# Patient Record
Sex: FEMALE | Race: ASIAN | Employment: FULL TIME | URBAN - METROPOLITAN AREA
[De-identification: names, ages, dates, MRNs, and addresses within clinical notes are randomized per-mention and may not be internally consistent; named-entity substitution may affect disease eponyms.]

---

## 2017-02-09 ENCOUNTER — PRE VISIT (OUTPATIENT)
Dept: MATERNAL FETAL MEDICINE | Facility: CLINIC | Age: 37
End: 2017-02-09

## 2017-02-16 ENCOUNTER — HOSPITAL ENCOUNTER (OUTPATIENT)
Dept: ULTRASOUND IMAGING | Facility: CLINIC | Age: 37
Discharge: HOME | End: 2017-02-16
Attending: OBSTETRICS & GYNECOLOGY | Admitting: OBSTETRICS & GYNECOLOGY
Payer: COMMERCIAL

## 2017-02-16 ENCOUNTER — OFFICE VISIT (OUTPATIENT)
Dept: MATERNAL FETAL MEDICINE | Facility: CLINIC | Age: 37
End: 2017-02-16
Attending: OBSTETRICS & GYNECOLOGY
Payer: COMMERCIAL

## 2017-02-16 DIAGNOSIS — O09.522 ELDERLY MULTIGRAVIDA WITH ANTEPARTUM CONDITION OR COMPLICATION, SECOND TRIMESTER: Primary | ICD-10-CM

## 2017-02-16 DIAGNOSIS — O09.522 AMA (ADVANCED MATERNAL AGE) MULTIGRAVIDA 35+, SECOND TRIMESTER: Primary | ICD-10-CM

## 2017-02-16 DIAGNOSIS — O26.90 PREGNANCY RELATED CONDITION, UNSPECIFIED TRIMESTER: ICD-10-CM

## 2017-02-16 PROCEDURE — 76811 OB US DETAILED SNGL FETUS: CPT

## 2017-02-16 PROCEDURE — 96040 ZZH GENETIC COUNSELING, EACH 30 MINUTES: CPT | Mod: ZF | Performed by: GENETIC COUNSELOR, MS

## 2017-02-16 NOTE — PROGRESS NOTES
"Please see \"Imaging\" tab under \"Chart Review\" for details of today's US.    Pati Short, DO    "

## 2017-02-16 NOTE — MR AVS SNAPSHOT
After Visit Summary   2/16/2017    Yoko Rae    MRN: [id number]           Patient Information     Date Of Birth          1980        Visit Information        Provider Department      2/16/2017 8:45 AM Maris Moore [facility] Maternal Fetal Medicine [facility]        Today's Diagnoses     Elderly multigravida with antepartum condition or complication, second trimester    -  1    Pregnancy related condition, unspecified trimester           Follow-ups after your visit        Who to contact     If you have questions or need follow up information about today's clinic visit or your schedule please contact [facility] MATERNAL FETAL MEDICINE [facility] directly at [phone].  Normal or non-critical lab and imaging results will be communicated to you by Efficient Cloudhart, letter or phone within 4 business days after the clinic has received the results. If you do not hear from us within 7 days, please contact the clinic through Efficient Cloudhart or phone. If you have a critical or abnormal lab result, we will notify you by phone as soon as possible.  Submit refill requests through netZentry or call your pharmacy and they will forward the refill request to us. Please allow 3 business days for your refill to be completed.          Additional Information About Your Visit        MyChart Information     netZentry gives you secure access to your electronic health record. If you see a primary care provider, you can also send messages to your care team and make appointments. If you have questions, please call your primary care clinic.  If you do not have a primary care provider, please call [phone] and they will assist you.        Care EveryWhere ID     This is your Care EveryWhere ID. This could be used by other organizations to access your [place] medical records  [id number]        Your Vitals Were     Last Period                   12/08/2016            Blood Pressure from Last 3 Encounters:   10/28/14 129/89    11/22/13 110/62   11/06/13 124/80    Weight from Last 3 Encounters:   10/28/14 74.7 kg (164 lb 9.6 oz)   11/22/13 75.3 kg (166 lb)   11/04/13 81.6 kg (180 lb)              We Performed the Following     MFM Genetic Counseling        Primary Care Provider Office Phone # Fax #    Sunny Hunt [phone]       [facility] OBGYN CONSULTS [address] [facility] [place] [address]  [facility] [address]        Thank you!     Thank you for choosing [facility] - [facility]  for your care. Our goal is always to provide you with excellent care. Hearing back from our patients is one way we can continue to improve our services. Please take a few minutes to complete the written survey that you may receive in the mail after your visit with us. Thank you!             Your Updated Medication List - Protect others around you: Learn how to safely use, store and throw away your medicines at www.disposemymeds.org.          This list is accurate as of: 2/16/17  4:59 PM.  Always use your most recent med list.                   Brand Name Dispense Instructions for use    naproxen 500 MG tablet    NAPROSYN    30 tablet    Take 1 tablet (500 mg) by mouth 2 times daily as needed for moderate pain

## 2017-02-16 NOTE — MR AVS SNAPSHOT
After Visit Summary   2/16/2017    Yoko Rae    MRN: [id number]           Patient Information     Date Of Birth          1980        Visit Information        Provider Department      2/16/2017 10:00 AM Pati Short,  [facility] Maternal Fetal [facility]        Today's Diagnoses     AMA (advanced maternal age) multigravida 37+, second trimester    -  1       Follow-ups after your visit        Who to contact     If you have questions or need follow up information about today's clinic visit or your schedule please contact [facility] MATERNAL FETAL MEDICINE [facility] directly at [phone].  Normal or non-critical lab and imaging results will be communicated to you by Dexcomhart, letter or phone within 4 business days after the clinic has received the results. If you do not hear from us within 7 days, please contact the clinic through BiTMI[employer] Inct or phone. If you have a critical or abnormal lab result, we will notify you by phone as soon as possible.  Submit refill requests through Elastica or call your pharmacy and they will forward the refill request to us. Please allow 3 business days for your refill to be completed.          Additional Information About Your Visit        MyChart Information     Elastica gives you secure access to your electronic health record. If you see a primary care provider, you can also send messages to your care team and make appointments. If you have questions, please call your primary care clinic.  If you do not have a primary care provider, please call [phone] and they will assist you.        Care EveryWhere ID     This is your Care EveryWhere ID. This could be used by other organizations to access your [place] medical records  [id number]        Your Vitals Were     Last Period                   12/08/2016            Blood Pressure from Last 3 Encounters:   10/28/14 129/89   11/22/13 110/62   11/06/13 124/80    Weight from Last 3 Encounters:    10/28/14 74.7 kg (164 lb 9.6 oz)   11/22/13 75.3 kg (166 lb)   11/04/13 81.6 kg (180 lb)              Today, you had the following     No orders found for display       Primary Care Provider Office Phone # Fax #    Sunny Hunt [phone]       [facility] OBGYN CONSULTS [address] [place] [address]  [facility] [address]        Thank you!     Thank you for choosing [facility] - [facility]  for your care. Our goal is always to provide you with excellent care. Hearing back from our patients is one way we can continue to improve our services. Please take a few minutes to complete the written survey that you may receive in the mail after your visit with us. Thank you!             Your Updated Medication List - Protect others around you: Learn how to safely use, store and throw away your medicines at www.disposemymeds.org.          This list is accurate as of: 2/16/17 10:32 AM.  Always use your most recent med list.                   Brand Name Dispense Instructions for use    naproxen 500 MG tablet    NAPROSYN    30 tablet    Take 1 tablet (500 mg) by mouth 2 times daily as needed for moderate pain

## 2017-02-16 NOTE — PROGRESS NOTES
[facility] Fetal [facility]  Genetic Counseling Consult    Patient:  Yoko Rae YOB: 1980   Date of Service: 2017 Referring Provider:  Dr. Leticia Thomas     Yoko Rae was seen at the [facility] for genetic counseling consultation as part of her appointment for comprehensive ultrasound.  The indication for genetic counseling was advanced maternal age.  She was accompanied to clinic by her .       Summary/Plan:     1.   At age 37 at delivery, the risk for a woman to have a baby at birth with a chromosome problem is about 0.5%. Yoko had a normal NIPT analysis earlier in pregnancy through her local prenatal care provider's office. This normal result would be expected to significantly reduce the risk of a chromosome problem in this pregnancy.    2.  Prior to today's ultrasound, I met with Yoko to review pregnancy and family history.  We also reviewed her previous screening/testing results and talked about what additional information might be provided by today's ultrasound.  See details below.    3.  After our conversation, Yoko had a comprehensive (level II) ultrasound today.  Please see the ultrasound report for further details.    4.  Yoko was not interested in invasive prenatal testing for fetal chromosome abnormalities at this time.    Pregnancy History:   /Parity:   (1 )   Age at Delivery: 37 year old  JOSEE: 2017, by Ultrasound    Gestational Age: 19w0d    Yoko reports that she takes prenatal vitamins.  She also reports occasionally taking a vitamin C supplement and acetaminophen.      Yoko reports that she is not sure about her exact LMP date, and so the JOSEE for this pregnancy was established using her 9 week ultrasound.      Yoko reports that she had gestational diabetes during her last pregnancy. She also reports delivering that baby at 36 weeks 6 days after a spontaneous onset of  "labor.       Family History:     A four-generation pedigree was obtained and will be scanned in under the  Media  tab in EPIC. The following significant findings were reported by Yoko and her :      Family history of birth defect: Yoko reports that she has a paternal first cousin that  of complications of a birth defect involving that baby's airway; she could not remember the name of this defect.  She reports that she is not aware of any other family history of other babies in the family with a similar issue in the many other relatives on that side of the family, nor was she aware of any specific genetic condition being diagnosed in this baby.     We talked about that most birth defects occur as an isolated birth defect, meaning that most commonly, they are not part of an underlying genetic syndrome or condition.  We talked about that most isolated birth defects are thought to have a \"multifactorial inheritance\" pattern, meaning that there are likely several genetic and/or environmental factors that have to come together in order to produce that birth defect.  We talked about that for most types of birth defects, we don't know those exact risk factors.  Assuming this was an isolated birth defect, we talked about that this individual is far enough away in relationship to this pregnancy that I would not expect this history to significantly increase the risk of a birth defect in this pregnancy.      Family history of diabetes:  Both Yoko and her  report some family history of adult onset diabetes.  We talked about that individuals with a family history of type II diabetes are generally thought to be at increased risk to develop type II diabetes.  Therefore, it is important for individuals with a family history of type II diabetes to let their physicians to know about this family history, so they can be appropriately screened for diabetes.  In addition, women with a family history of type II " diabetes are thought to be more likely to develop gestational diabetes.    Otherwise, the reported family history is negative for multiple miscarriages, mental retardation, known genetic conditions, and consanguinity.       Carrier Screening:       Yoko and her  report that they are both of   ancestry.  We briefly talked about that he hemoglobinopathies are a group of genetic blood diseases that occur with increased frequency in individuals of  ancestry and carrier screening for these conditions is available.  Carrier screening for the hemoglobinopathies includes a CBC with red blood cell indices, a ferritin level, and a quantitative hemoglobin electrophoresis or HPLC.  In addition,  screening in the [facility] includes many of the hemoglobinopathies.    In addition to ethnic-based carrier screening, expanded carrier screening for mutations in a large panel of genes associated with autosomal recessive conditions including cystic fibrosis, spinal muscular atrophy, and others, is now available.      If Yoko decides that she would like to have hemoglobinopathy or other carrier screening in the future, I would be happy to help facilitate this, if needed.       Risk Assessment for Chromosome Conditions:       We talked about that the risk for fetal chromosome abnormalities increases with maternal age. We briefly discussed specific features of common chromosome abnormalities, including Down syndrome, trisomy 13, trisomy 18, and sex chromosome trisomies.      At age 37 at delivery, the risk to have a baby with any chromosome abnormality at birth is about 1 in 202.  (At age 37 at midtrimester, the risk to have a baby with any chromosome abnormality at midtrimester is about 1 in 135.)      Yoko had maternal serum-based screening earlier in pregnancy.  See below for summary of her test results:     Non-invasive Prenatal Testing (NIPT)  - This test involves measurement of cell-free DNA  testing, which is of both maternal and placental/fetal origin.  - This test screens for fetal trisomy 21, trisomy 13, trisomy 18, and sex chromosome aneuploidy.  - While this test is thought to be highly specific/sensitive with respect to screening for trisomy 21, trisomy 13, and trisomy 18, rare false positives and false negatives do occur. There is still limited data about the exact sensitivity/specificity of this test with respect to screening for sex chromosome aneuploidy.    Yoko had a EztxmyeD50 test earlier in pregnancy; we reviewed the results today, which are normal for chromosome 13, chromosome 18 and chromosome 21 (no aneuploidy detected).    First trimester ultrasound with nuchal translucency and nasal bone assessment was not performed in this pregnancy, to our knowledge.     We talked about that these normal results for chromosomes 21, 13, and 18 likely very significantly reduces the chance of Down syndrome, Trisomy 13, and Trisomy 18 in this pregnancy. However, we talked about that there are rare false negatives that can occur with this test.  - This test cannot screen for open neural tube defects, and so consideration of maternal serum AFP 15 weeks or later is recommended.       Testing Options:       We reviewed the benefits and limitations of screening and diagnostic tests:    - We talked about that screening tests provide a risk assessment specific to the pregnancy for certain fetal chromosome abnormalities, but cannot definitively diagnose or exclude a fetal chromosome abnormality. Follow-up genetic counseling and consideration of diagnostic testing is recommended with any abnormal screening result.     - We discussed that diagnostic tests are associated with a risk of miscarriage. These tests can detect fetal chromosome abnormalities with greater than 99% certainty. Results can rarely be complicated by maternal cell contamination or mosaicism and are limited by the resolution of cytogenetic  G-banding technology.     -There is no screening nor diagnostic test that can detect all forms of birth defects or mental disability.      We discussed the following screening and testing options:     Genetic Amniocentesis  - This is an invasive procedure typically performed at 15 weeks or later, through which amniotic fluid is obtained for the purpose of chromosome analysis and/or other prenatal genetic analysis.  - Amniocentesis is considered a diagnostic test for chromosome problems during pregnancy.  - The risk of pregnancy loss associated with amniocentesis is generally estimated to be 1/500 or less.  - Amniotic fluid AFP measurement is also done to screen for the possibility of open neural tube or ventral defects.     Comprehensive (Level II) ultrasound  - This detailed ultrasound is usually performed between 18-22 weeks gestation to screen for major birth defects.  - It also screens for ultrasound markers that might increase the risk for a chromosome problem in a pregnancy.    Face-to-face time of the genetic counseling consult was 40 minutes.    Noreen Moore MS, [facility]  Genetic Counselor  Ph: [phone]  Pager: [phone]

## 2017-05-04 ENCOUNTER — ALLIED HEALTH/NURSE VISIT (OUTPATIENT)
Dept: EDUCATION SERVICES | Facility: CLINIC | Age: 37
End: 2017-05-04
Payer: COMMERCIAL

## 2017-05-04 DIAGNOSIS — O24.419 GESTATIONAL DIABETES: Primary | ICD-10-CM

## 2017-05-04 PROCEDURE — G0109 DIAB MANAGE TRN IND/GROUP: HCPCS

## 2017-05-04 RX ORDER — LANCETS
EACH MISCELLANEOUS
Qty: 1 BOX | Refills: 4 | Status: SHIPPED | OUTPATIENT
Start: 2017-05-04

## 2017-05-04 NOTE — MR AVS SNAPSHOT
After Visit Summary   5/4/2017    Yoko Rae    MRN: [id number]           Patient Information     Date Of Birth          1980        Visit Information        Provider Department      5/4/2017 1:00 PM CS GDM [facility]        Today's Diagnoses     Gestational diabetes    -  1       Follow-ups after your visit        Who to contact     If you have questions or need follow up information about today's clinic visit or your schedule please contact [facility] directly at [phone].  Normal or non-critical lab and imaging results will be communicated to you by Cartivahart, letter or phone within 4 business days after the clinic has received the results. If you do not hear from us within 7 days, please contact the clinic through Cartivahart or phone. If you have a critical or abnormal lab result, we will notify you by phone as soon as possible.  Submit refill requests through [employer] or call your pharmacy and they will forward the refill request to us. Please allow 3 business days for your refill to be completed.          Additional Information About Your Visit        MyChart Information     [employer] gives you secure access to your electronic health record. If you see a primary care provider, you can also send messages to your care team and make appointments. If you have questions, please call your primary care clinic.  If you do not have a primary care provider, please call [phone] and they will assist you.        Care EveryWhere ID     This is your Care EveryWhere ID. This could be used by other organizations to access your [place] medical records  [id number]        Your Vitals Were     Last Period                   12/08/2016            Blood Pressure from Last 3 Encounters:   10/28/14 129/89   11/22/13 110/62   11/06/13 124/80    Weight from Last 3 Encounters:   10/28/14 74.7 kg (164 lb 9.6 oz)   11/22/13 75.3 kg (166 lb)   11/04/13 81.6 kg (180 lb)              We  Performed the Following     DIABETES EDUCATION - Group []           Today's Medication Changes          These changes are accurate as of: 5/4/17  3:05 PM.  If you have any questions, ask your nurse or doctor.               Start taking these medicines.        Dose/Directions    acetone (Urine) test Strp   Used for:  Gestational diabetes        Test daily until negative for 1 week, then reduce testing to once/week.   Quantity:  20 each   Refills:  1       blood glucose monitoring lancets   Used for:  Gestational diabetes        Use to test blood sugar 4 times daily or as directed.  Ok to substitute alternative if insurance prefers.   Quantity:  1 Box   Refills:  4            Where to get your medicines      These medications were sent to [id number] - [place] [facility] [place], [place] - [address]  [address], [place] [facility] [address]     Phone:  [phone]     acetone (Urine) test Strp    blood glucose monitoring lancets                Primary Care Provider Office Phone # Fax #    Sunny Hunt [phone]       [facility] CONSULTS [address] [address]        Thank you!     Thank you for choosing [facility]  for your care. Our goal is always to provide you with excellent care. Hearing back from our patients is one way we can continue to improve our services. Please take a few minutes to complete the written survey that you may receive in the mail after your visit with us. Thank you!             Your Updated Medication List - Protect others around you: Learn how to safely use, store and throw away your medicines at www.disposemymeds.org.          This list is accurate as of: 5/4/17  3:05 PM.  Always use your most recent med list.                   Brand Name Dispense Instructions for use    acetone (Urine) test Strp     20 each    Test daily until negative for 1 week, then reduce testing to once/week.       blood glucose monitoring  lancets     1 Box    Use to test blood sugar 4 times daily or as directed.  Ok to substitute alternative if insurance prefers.       naproxen 500 MG tablet    NAPROSYN    30 tablet    Take 1 tablet (500 mg) by mouth 2 times daily as needed for moderate pain

## 2017-05-04 NOTE — PROGRESS NOTES
Diabetes Self-Management Training - Gestational Diabetes    SUBJECTIVE/OBJECTIVE:  Yoko Rae presents today for education related to gestational diabetes.  She is accompanied by self    Patient's gestational diabetes management related comments/concerns: none    Patient's emotional response to diabetes: expresses readiness to learn    LMP 2016    Pre pregnancy weight: 155#    Weight gain 15 lbs at 30 weeks gestation.    Estimated Date of Delivery: 2017    Lab Results   Component Value Date    GLC 96 10/30/2014     3 hour OGTT: Fastin; 1 hr: 183; 2 hr: 197, 3 hr: 137 on 17    History   Smoking Status     Never Smoker   Smokeless Tobacco     Never Used       Lifestyle and Health Behaviors:  Physical Activity: walking 2x/d for 30 min    Nutrition:  Patient eats 3 meals and 3 snacks per day.    Breakfast:  2 slices of toast with Ivorian tea (with milk and sugar)  Snack:  chobani yogurt and coffee with 2 cream  Lunch:  Vegetable and chicken soup (1.5 c) with 1 banana  Snack:  Granola bar  Dinner:  2 roti with 1 c veg escobar and 0.5 c plain yogurt  Bedtime Snack:  milk    Other time(s) food is eaten? no     Beverages: milk, tea (with milk and sugar), no soda    Cultural/Quaker diet restrictions: no red meat (beef, pork)     Biggest challenge to healthy eating is:  knowing what to eat    Pre-Ad Vitamin: Yes    Supplements: No   Experiencing nausea?  No     Socio/Economic considerations:  Support System: family    Health Beliefs and Attitudes:   Stage of Change: PREPARATION (Decided to change - considering how)    ASSESSMENT:  Needs assistance with meal plan and checking blood sugars    INTERVENTION:  Patient was instructed on Accu-Chek Maura Plus meter and was able to provide an accurate return demonstration. Patient's blood glucose reading today was 120 mg/dL.    Educational topics covered today:  GDM diagnosis, pathophysiology, Risks and Complications of GDM, Means of controlling GDM, Using  a Blood Glucose Monitor, Blood Glucose Goals, Logging and Interpreting Glucose Results, Ketone Testing, When to Call a Diabetes Educator or OB Provider, Healthy Eating During Pregnancy, Counting Carbohydrates, Meal Planning for GDM, and Physical Activity    Educational materials provided today:   Shawn Understanding Gestational Diabetes  GDM Log Book  Sharps Disposal  Care After Delivery    Pt verbalized understanding of concepts discussed and recommendations provided today.     PLAN:  Check glucose 4 times daily, before breakfast and 1 hour after each meal.     Check Ketones daily for one week, if negative, reduce testing to once a week.     Physical activity recommended: continue as able.    Meal plan: 2-3 carbs at breakfast, 3-4 carbs at lunch, 3-4 carbs at supper, 1-2 carbs at 3 snacks a day.  Follow consistent CHO meal plan, eat CHO and protein/fat at all meals/snacks.    Call/e-mail/MyChart message diabetes educator if 3 or more blood sugars are above the goal in 1 week or if ketones are positive.     Call/e-mail/MyChart message with questions/concerns.    FOLLOW-UP:  Follow up with diabetes educator in 1 week.  Call or e-mail educator if 3 or more blood sugars are above goal in 1 week.  Call or e-mail with questions or concerns.    SIVA Bah CDE    Time Spent: 90 minutes  Encounter type: Group class

## 2017-05-11 ENCOUNTER — ALLIED HEALTH/NURSE VISIT (OUTPATIENT)
Dept: EDUCATION SERVICES | Facility: CLINIC | Age: 37
End: 2017-05-11
Payer: COMMERCIAL

## 2017-05-11 ENCOUNTER — TELEPHONE (OUTPATIENT)
Dept: EDUCATION SERVICES | Facility: CLINIC | Age: 37
End: 2017-05-11

## 2017-05-11 VITALS — BODY MASS INDEX: 36.17 KG/M2 | WEIGHT: 179.1 LBS

## 2017-05-11 DIAGNOSIS — O24.419 GESTATIONAL DIABETES: Primary | ICD-10-CM

## 2017-05-11 PROCEDURE — G0108 DIAB MANAGE TRN  PER INDIV: HCPCS

## 2017-05-11 NOTE — TELEPHONE ENCOUNTER
Phone call to patient:  She is notified to call [facility] and get an appointment set up for insulin management. Phone number given.  I will call her when we have order in hand for insulin.  Clarisse Alonso RN,CDE

## 2017-05-11 NOTE — MR AVS SNAPSHOT
"              After Visit Summary   5/11/2017    Yoko Rae    MRN: [id number]           Patient Information     Date Of Birth          1980        Visit Information        Provider Department      5/11/2017 12:30 PM [place] DIABETIC ED RESOURCE [facility]        Today's Diagnoses     Gestational diabetes    -  1      Care Instructions    1. Check blood sugar 4 times a day, before breakfast and 1 hour after the start of each meal.     2. Check urine ketones when you wake up every morning for 7 days. If negative everyday, reduce testing to once a week.    3. Follow the recommended meal plan: eat something every 2-3 hours, include protein/fat and carbohydrate at every meal and snack, have 2-3 carbs at breakfast, 3-4 carbs at lunch, 3-4 carbs at supper, 1-2 carbs at 3 snacks per day.     4. Add activity to every day, try walking or being active after each meal to help control blood sugar levels.    5.Call or e-mail educator if 3 or more blood sugars are above goal in 1 week. Call or e-mail with questions or concerns.      Kimi Wilson RD LD CDE      [place] [facility] for the [facility]:  For Your Diabetes Education or Nutrition Appointments Call:  [phone]   For Diabetes Education and Nutrition Related Questions:   Phone: [phone]  E-mail: [email or web address]  Fax: [phone]   If you need a medication refill please contact your pharmacy. Please allow 3 business days for your refills to be completed.     Taking Insulin:    1. Take NPH (Humulin-N or Novolin-N) - 8 units at bedtime.     - Do a 2 unit \"prime\" before each injection, be sure a stream of insulin comes out of the needle before you give your injection. After you inject, hold the needle under the skin to the count of 10 to be sure all of the insulin goes in.     - Rotate injection sites, keeping at least 1 inch apart from last injection site and 2 inches away from belly button or " surgical scars.    2. Pen - Use a new pen needle for each injection. Always remove pen needle from the insulin pen after use and do not store insulin pens with the needle on the pen.     3. Store insulin you are not using in the refrigerator (do not freeze). Take new insulin out of the refrigerator a few hours prior to use to bring to room temperature.     4. Once opened NPH Pens (Humulin N or Novolin N) can be kept at room temperature for 14 days after opened.. Do not use the opened insulin after this time has passed, even if there is still medicine inside.     5. Always carry your blood sugar meter and a sugar source like glucose tablets with you in case of a low blood sugar. Treat a low blood sugar (less than 70) with 15 grams of carbohydrate (1 carb choice). Wait 15 minutes, recheck blood sugar. If blood sugar is still below 70, repeat the treatment.    6. Wear Medical ID or carry a wallet card stating you have Diabetes.    7. Call your doctor or diabetes educator if you begin having low blood sugars or if you have questions or concerns.     8. Complete and mail in the form to inform the [place] [employer] that you have started taking insulin.    9. Follow-up: Call ([phone]), e-mail ([email or web address]), or send RESPACEt message to educator with blood sugar readings in 4 days.    [place] [facility] for the [facility]:  For Your Diabetes Education or Nutrition Appointments Call:  [phone]   For Nutrition Related Questions Call:   Phone: [phone]  E-mail: [email or web address]  Fax: [phone]   If you need a medication refill please contact your pharmacy. Please allow 3 business days for your refills to be completed.         Follow-ups after your visit        Who to contact     If you have questions or need follow up information about today's clinic visit or your schedule please contact [facility] directly at  [phone].  Normal or non-critical lab and imaging results will be communicated to you by [employer]bo [employer]hart, letter or phone within 4 business days after the clinic has received the results. If you do not hear from us within 7 days, please contact the clinic through [employer] or phone. If you have a critical or abnormal lab result, we will notify you by phone as soon as possible.  Submit refill requests through ROLI or call your pharmacy and they will forward the refill request to us. Please allow 3 business days for your refill to be completed.          Additional Information About Your Visit        [employer] Information     [employer] gives you secure access to your electronic health record. If you see a primary care provider, you can also send messages to your care team and make appointments. If you have questions, please call your primary care clinic.  If you do not have a primary care provider, please call [phone] and they will assist you.        Care EveryWhere ID     This is your Care EveryWhere ID. This could be used by other organizations to access your [place] medical records  [id number]        Your Vitals Were     Last Period Breastfeeding? BMI (Body Mass Index)             12/08/2016 Unknown 36.17 kg/m2          Blood Pressure from Last 3 Encounters:   10/28/14 129/89   11/22/13 110/62   11/06/13 124/80    Weight from Last 3 Encounters:   05/11/17 81.2 kg (179 lb 1.6 oz)   10/28/14 74.7 kg (164 lb 9.6 oz)   11/22/13 75.3 kg (166 lb)              We Performed the Following     DIABETES EDUCATION - Individual  []        Primary Care Provider Office Phone # Fax #    Sunny Hunt [phone]       [facility] [address]  [facility] [address]        Thank you!     Thank you for choosing [facility]  for your care. Our goal is always to provide you with excellent care. Hearing back from our patients is one way we can continue to improve our  services. Please take a few minutes to complete the written survey that you may receive in the mail after your visit with us. Thank you!             Your Updated Medication List - Protect others around you: Learn how to safely use, store and throw away your medicines at www.disposemymeds.org.          This list is accurate as of: 5/11/17  1:34 PM.  Always use your most recent med list.                   Brand Name Dispense Instructions for use    acetone (Urine) test Strp     20 each    Test daily until negative for 1 week, then reduce testing to once/week.       blood glucose monitoring lancets     1 Box    Use to test blood sugar 4 times daily or as directed.  Ok to substitute alternative if insurance prefers.       naproxen 500 MG tablet    NAPROSYN    30 tablet    Take 1 tablet (500 mg) by mouth 2 times daily as needed for moderate pain

## 2017-05-11 NOTE — PATIENT INSTRUCTIONS
"1. Check blood sugar 4 times a day, before breakfast and 1 hour after the start of each meal.     2. Check urine ketones when you wake up every morning for 7 days. If negative everyday, reduce testing to once a week.    3. Follow the recommended meal plan: eat something every 2-3 hours, include protein/fat and carbohydrate at every meal and snack, have 2-3 carbs at breakfast, 3-4 carbs at lunch, 3-4 carbs at supper, 1-2 carbs at 3 snacks per day.     4. Add activity to every day, try walking or being active after each meal to help control blood sugar levels.    5.Call or e-mail educator if 3 or more blood sugars are above goal in 1 week. Call or e-mail with questions or concerns.      Kimi Wilson RD LD CDE      [place] [facility] for the [facility]:  For Your Diabetes Education or Nutrition Appointments Call:  [phone]   For Diabetes Education and Nutrition Related Questions:   Phone: [phone]  E-mail: [email or web address]  Fax: [phone]   If you need a medication refill please contact your pharmacy. Please allow 3 business days for your refills to be completed.     Taking Insulin:    1. Take NPH (Humulin-N or Novolin-N) - 8 units at bedtime.     - Do a 2 unit \"prime\" before each injection, be sure a stream of insulin comes out of the needle before you give your injection. After you inject, hold the needle under the skin to the count of 10 to be sure all of the insulin goes in.     - Rotate injection sites, keeping at least 1 inch apart from last injection site and 2 inches away from belly button or surgical scars.    2. Pen - Use a new pen needle for each injection. Always remove pen needle from the insulin pen after use and do not store insulin pens with the needle on the pen.     3. Store insulin you are not using in the refrigerator (do not freeze). Take new insulin out of the refrigerator a few hours prior to use to bring to room temperature.     4. " Once opened NPH Pens (Humulin N or Novolin N) can be kept at room temperature for 14 days after opened.. Do not use the opened insulin after this time has passed, even if there is still medicine inside.     5. Always carry your blood sugar meter and a sugar source like glucose tablets with you in case of a low blood sugar. Treat a low blood sugar (less than 70) with 15 grams of carbohydrate (1 carb choice). Wait 15 minutes, recheck blood sugar. If blood sugar is still below 70, repeat the treatment.    6. Wear Medical ID or carry a wallet card stating you have Diabetes.    7. Call your doctor or diabetes educator if you begin having low blood sugars or if you have questions or concerns.     8. Complete and mail in the form to inform the [place] Department [employer] that you have started taking insulin.    9. Follow-up: Call ([phone]), e-mail ([email or web address]), or send Readmillt message to educator with blood sugar readings in 4 days.    [place] [facility] for the [facility]:  For Your Diabetes Education or Nutrition Appointments Call:  [phone]   For Nutrition Related Questions Call:   Phone: [phone]  E-mail: [email or web address]  Fax: [phone]   If you need a medication refill please contact your pharmacy. Please allow 3 business days for your refills to be completed.

## 2017-05-11 NOTE — TELEPHONE ENCOUNTER
[facility] called to say that they received a request for insulin for Yoko.  It is okay to start the insulin but the patient needs to be referred to the [facility].  They will manage the patients on insulin for [facility].      Karolina requested that we call the patient and let her know.  Insulin orders will be faxed back to us.  Clarisse Alonso RN,CDE

## 2017-05-12 ENCOUNTER — TELEPHONE (OUTPATIENT)
Dept: EDUCATION SERVICES | Facility: CLINIC | Age: 37
End: 2017-05-12

## 2017-05-12 DIAGNOSIS — O24.419 GESTATIONAL DIABETES MELLITUS, ANTEPARTUM: Primary | ICD-10-CM

## 2017-05-12 NOTE — TELEPHONE ENCOUNTER
We received order to go ahead with insulin.  Patient has an appointment set up with [facility].  5/19/17.  She is asked to send numbers over on Tuesday 5/16/17 just to make sure she is controlled until she gets in with endo.  Clarisse Alonso RN,CDE

## 2017-05-17 ENCOUNTER — TELEPHONE (OUTPATIENT)
Dept: EDUCATION SERVICES | Facility: CLINIC | Age: 37
End: 2017-05-17

## 2017-05-17 DIAGNOSIS — O24.414 INSULIN CONTROLLED GESTATIONAL DIABETES MELLITUS (GDM) DURING PREGNANCY, ANTEPARTUM: ICD-10-CM

## 2017-05-17 NOTE — TELEPHONE ENCOUNTER
Gestational Diabetes Follow-up    Subjective/Objective:    Yoko Rae sent in blood glucose log for review. Last date of communication was: 5/12/17.    Gestational diabetes is being managed with diet, activity and Humulin/Novolin NPH Insulin 8 units at bedtime    Estimated Date of Delivery: Data Unavailable    BG/Food Log:       Assessment:    Ketones:negative.   Fasting blood glucoses: 0% in target.  After breakfast: 100% in target.  After lunch: 100% in target.  After dinner: 100% in target.    Plan/Response:    Stephen Babcock,    It is Clarisse again.  Thanks for sending these in.      It looks like your fasting blood sugar is above target with the insulin.    Please increase it by 1 unit at bedtime and take your log to your appointment with endo on Friday.    They will take over from there.    Thanks!  Clarisse      Any diabetes medication dose changes were made via the CDE Protocol and Collaborative Practice Agreement with the patient's referring provider. A copy of this encounter was shared with the provider.

## 2017-06-26 ENCOUNTER — ANESTHESIA (OUTPATIENT)
Dept: OBGYN | Facility: CLINIC | Age: 37
End: 2017-06-26
Payer: COMMERCIAL

## 2017-06-26 ENCOUNTER — ANESTHESIA EVENT (OUTPATIENT)
Dept: OBGYN | Facility: CLINIC | Age: 37
End: 2017-06-26
Payer: COMMERCIAL

## 2017-06-26 PROCEDURE — 25000128 H RX IP 250 OP 636: Performed by: ANESTHESIOLOGY

## 2017-06-26 PROCEDURE — 37000011 ZZH ANESTHESIA WARD SERVICE

## 2017-06-26 PROCEDURE — S0020 INJECTION, BUPIVICAINE HYDRO: HCPCS | Performed by: ANESTHESIOLOGY

## 2017-06-26 PROCEDURE — 25000125 ZZHC RX 250: Performed by: ANESTHESIOLOGY

## 2017-06-26 RX ADMIN — BUPIVACAINE HYDROCHLORIDE 1 ML: 2.5 INJECTION, SOLUTION EPIDURAL; INFILTRATION; INTRACAUDAL at 21:26

## 2017-06-26 RX ADMIN — FENTANYL CITRATE 25 MCG: 50 INJECTION, SOLUTION INTRAMUSCULAR; INTRAVENOUS at 21:26

## 2017-06-27 NOTE — ANESTHESIA PROCEDURE NOTES
Peripheral nerve/Neuraxial procedure note : intrathecal  Pre-Procedure  Performed by WESLEY AMATO  Referred by MELECIO  Location: floor      Pre-Anesthestic Checklist: patient identified, IV checked, risks and benefits discussed, informed consent and monitors and equipment checked    Timeout  Correct Patient: Yes   Correct Procedure: Yes   Correct Site: Yes   Correct Laterality: N/A   Correct Position: Yes   Site Marked: N/A   .   Procedure Documentation    .    Procedure:    Intrathecal.  Insertion Site:L3-4  (midline approach)      Patient Prep;povidone-iodine 7.5% surgical scrub.  .  Needle: Mony-Jareth Spinal Needle (gauge): 24  Spinal/LP Needle Length (inches): 3.5 # of attempts: 1 and  # of redirects:  1 .       Assessment/Narrative  Paresthesias: No.  .  .  clear CSF fluid removed . Comments:  1 ml 0.25% bupivacaine and 25 mcg Fentanyl and 0.2 ml 0.75% hyperbaric bupivicaine  No complications

## 2017-06-27 NOTE — ANESTHESIA PREPROCEDURE EVALUATION
Anesthesia Plan      History & Physical Review      ASA Status:  2 .        Plan for Spinal (ITN)          Postoperative Care      Consents                          .

## 2017-06-28 NOTE — ANESTHESIA POSTPROCEDURE EVALUATION
Patient: Yoko Rae    * No procedures listed *    Diagnosis:* No pre-op diagnosis entered *  Diagnosis Additional Information: No value filed.    Anesthesia Type:  No value filed.    Note:  Anesthesia Post Evaluation    Patient location during evaluation: Bedside  Patient participation: Able to fully participate in evaluation  Level of consciousness: awake and alert  Pain management: adequate  Airway patency: patent  Cardiovascular status: acceptable  Respiratory status: acceptable  Hydration status: acceptable  PONV: none     Anesthetic complications: None    Comments: Patient doing well. No headaches, low back pain or residual numbness/paresthesias. Eating and drinking without difficulty. All questions answered. No concerns from patient. No anesthetic complications.           Last vitals:  Vitals:    06/27/17 0850 06/27/17 1300 06/27/17 1538   BP: 133/81 135/80 143/86   Resp: 16 16 16   Temp: 36.7  C (98.1  F) 36.9  C (98.4  F) 36.7  C (98  F)   SpO2:            Electronically Signed By: Kaitlin Salomon MD  June 27, 2017  7:53 PM

## 2017-12-31 ENCOUNTER — HEALTH MAINTENANCE LETTER (OUTPATIENT)
Age: 37
End: 2017-12-31

## 2020-03-02 ENCOUNTER — HEALTH MAINTENANCE LETTER (OUTPATIENT)
Age: 40
End: 2020-03-02

## 2020-12-14 ENCOUNTER — HEALTH MAINTENANCE LETTER (OUTPATIENT)
Age: 40
End: 2020-12-14

## 2021-04-18 ENCOUNTER — HEALTH MAINTENANCE LETTER (OUTPATIENT)
Age: 41
End: 2021-04-18

## 2021-10-02 ENCOUNTER — HEALTH MAINTENANCE LETTER (OUTPATIENT)
Age: 41
End: 2021-10-02

## 2022-05-14 ENCOUNTER — HEALTH MAINTENANCE LETTER (OUTPATIENT)
Age: 42
End: 2022-05-14

## 2022-09-03 ENCOUNTER — HEALTH MAINTENANCE LETTER (OUTPATIENT)
Age: 42
End: 2022-09-03

## 2023-06-03 ENCOUNTER — HEALTH MAINTENANCE LETTER (OUTPATIENT)
Age: 43
End: 2023-06-03

## 2024-02-17 ENCOUNTER — HEALTH MAINTENANCE LETTER (OUTPATIENT)
Age: 44
End: 2024-02-17